# Patient Record
Sex: FEMALE | Race: WHITE | Employment: UNEMPLOYED | URBAN - NONMETROPOLITAN AREA
[De-identification: names, ages, dates, MRNs, and addresses within clinical notes are randomized per-mention and may not be internally consistent; named-entity substitution may affect disease eponyms.]

---

## 2022-02-13 ENCOUNTER — HOSPITAL ENCOUNTER (INPATIENT)
Age: 57
LOS: 2 days | Discharge: HOME | DRG: 439 | End: 2022-02-15
Attending: EMERGENCY MEDICINE
Payer: COMMERCIAL

## 2022-02-13 ENCOUNTER — APPOINTMENT (OUTPATIENT)
Dept: CT IMAGING | Age: 57
DRG: 439 | End: 2022-02-13
Payer: COMMERCIAL

## 2022-02-13 DIAGNOSIS — K85.02 IDIOPATHIC ACUTE PANCREATITIS WITH INFECTED NECROSIS: Primary | ICD-10-CM

## 2022-02-13 PROBLEM — K85.90 PANCREATITIS, UNSPECIFIED PANCREATITIS TYPE: Status: ACTIVE | Noted: 2022-02-13

## 2022-02-13 LAB
ALBUMIN SERPL-MCNC: 4 GM/DL (ref 3.4–5)
ALP BLD-CCNC: 81 U/L (ref 46–116)
ALT SERPL-CCNC: 24 U/L (ref 14–63)
AMORPHOUS: ABNORMAL
ANION GAP: 9 MEQ/L (ref 8–16)
AST SERPL-CCNC: 15 U/L (ref 15–37)
BACTERIA: ABNORMAL
BASOPHILS # BLD: 0.8 % (ref 0–3)
BILIRUB SERPL-MCNC: 0.6 MG/DL (ref 0.2–1)
BILIRUBIN URINE: NEGATIVE
BLOOD, URINE: NEGATIVE
BUN BLDV-MCNC: 15 MG/DL (ref 7–18)
CASTS UA: ABNORMAL /LPF
CHARACTER, URINE: CLEAR
CHLORIDE BLD-SCNC: 104 MEQ/L (ref 98–107)
CO2: 28 MEQ/L (ref 21–32)
COLOR: ABNORMAL
CREAT SERPL-MCNC: 0.9 MG/DL (ref 0.6–1.3)
CRYSTALS, UA: ABNORMAL
EOSINOPHILS RELATIVE PERCENT: 1 % (ref 0–4)
EPITHELIAL CELLS, UA: ABNORMAL /HPF
GFR, ESTIMATED: 69 ML/MIN/1.73M2
GLUCOSE BLD-MCNC: 123 MG/DL (ref 74–106)
GLUCOSE, URINE: NEGATIVE MG/DL
HCT VFR BLD CALC: 44.8 % (ref 37–47)
HEMOGLOBIN: 14.6 GM/DL (ref 12–16)
KETONES, URINE: NEGATIVE
LACTATE: 2 MMOL/L (ref 0.9–1.7)
LEUKOCYTE ESTERASE, URINE: NEGATIVE
LIPASE: > 1500 U/L (ref 73–393)
LIPASE: > 3000 U/L (ref 5.6–51.3)
LYMPHOCYTES # BLD: 16.3 % (ref 15–47)
MCH RBC QN AUTO: 31.5 PG (ref 27–31)
MCHC RBC AUTO-ENTMCNC: 32.7 GM/DL (ref 33–37)
MCV RBC AUTO: 96.5 FL (ref 81–99)
MONOCYTES: 7.2 % (ref 0–12)
MUCUS: ABNORMAL
NITRITE, URINE: NEGATIVE
PDW BLD-RTO: 12.1 % (ref 11.5–14.5)
PH UA: 7 (ref 5–9)
PLATELET # BLD: 344 THOU/MM3 (ref 130–400)
PLATELET ESTIMATE: ADEQUATE
PMV BLD AUTO: 7.7 FL (ref 7.4–10.4)
POC CALCIUM: 8.8 MG/DL (ref 8.5–10.1)
POTASSIUM SERPL-SCNC: 3.6 MEQ/L (ref 3.5–5.1)
PROTEIN UA: 100 MG/DL
RBC # BLD: 4.64 MILL/MM3 (ref 4.2–5.4)
RBC UA: ABNORMAL /HPF
REFLEX TO URINE C & S: ABNORMAL
SCAN OF BLOOD SMEAR: NORMAL
SEGS: 74.7 % (ref 43–75)
SODIUM BLD-SCNC: 141 MEQ/L (ref 136–145)
SPECIFIC GRAVITY UA: 1.01 (ref 1–1.03)
TOTAL PROTEIN: 7.8 GM/DL (ref 6.4–8.2)
UROBILINOGEN, URINE: 0.2 EU/DL (ref 0–1)
WBC # BLD: 14.9 THOU/MM3 (ref 4.8–10.8)
WBC UA: ABNORMAL /HPF

## 2022-02-13 PROCEDURE — 2580000003 HC RX 258: Performed by: EMERGENCY MEDICINE

## 2022-02-13 PROCEDURE — 87040 BLOOD CULTURE FOR BACTERIA: CPT

## 2022-02-13 PROCEDURE — 96375 TX/PRO/DX INJ NEW DRUG ADDON: CPT

## 2022-02-13 PROCEDURE — 81001 URINALYSIS AUTO W/SCOPE: CPT

## 2022-02-13 PROCEDURE — 83690 ASSAY OF LIPASE: CPT

## 2022-02-13 PROCEDURE — 74177 CT ABD & PELVIS W/CONTRAST: CPT

## 2022-02-13 PROCEDURE — 6360000002 HC RX W HCPCS: Performed by: PHYSICIAN ASSISTANT

## 2022-02-13 PROCEDURE — 2060000000 HC ICU INTERMEDIATE R&B

## 2022-02-13 PROCEDURE — 96374 THER/PROPH/DIAG INJ IV PUSH: CPT

## 2022-02-13 PROCEDURE — 99223 1ST HOSP IP/OBS HIGH 75: CPT | Performed by: HOSPITALIST

## 2022-02-13 PROCEDURE — 85025 COMPLETE CBC W/AUTO DIFF WBC: CPT

## 2022-02-13 PROCEDURE — 80053 COMPREHEN METABOLIC PANEL: CPT

## 2022-02-13 PROCEDURE — 6370000000 HC RX 637 (ALT 250 FOR IP): Performed by: PHYSICIAN ASSISTANT

## 2022-02-13 PROCEDURE — 99222 1ST HOSP IP/OBS MODERATE 55: CPT | Performed by: PHYSICIAN ASSISTANT

## 2022-02-13 PROCEDURE — 83605 ASSAY OF LACTIC ACID: CPT

## 2022-02-13 PROCEDURE — 6360000002 HC RX W HCPCS: Performed by: EMERGENCY MEDICINE

## 2022-02-13 PROCEDURE — 99282 EMERGENCY DEPT VISIT SF MDM: CPT

## 2022-02-13 PROCEDURE — 2580000003 HC RX 258: Performed by: PHYSICIAN ASSISTANT

## 2022-02-13 PROCEDURE — 6360000004 HC RX CONTRAST MEDICATION: Performed by: EMERGENCY MEDICINE

## 2022-02-13 RX ORDER — SODIUM CHLORIDE 9 MG/ML
INJECTION, SOLUTION INTRAVENOUS CONTINUOUS
Status: DISCONTINUED | OUTPATIENT
Start: 2022-02-13 | End: 2022-02-15 | Stop reason: HOSPADM

## 2022-02-13 RX ORDER — ONDANSETRON 2 MG/ML
4 INJECTION INTRAMUSCULAR; INTRAVENOUS EVERY 6 HOURS PRN
Status: DISCONTINUED | OUTPATIENT
Start: 2022-02-13 | End: 2022-02-15 | Stop reason: HOSPADM

## 2022-02-13 RX ORDER — ACETAMINOPHEN 650 MG/1
650 SUPPOSITORY RECTAL EVERY 6 HOURS PRN
Status: DISCONTINUED | OUTPATIENT
Start: 2022-02-13 | End: 2022-02-15 | Stop reason: HOSPADM

## 2022-02-13 RX ORDER — POLYETHYLENE GLYCOL 3350 17 G/17G
17 POWDER, FOR SOLUTION ORAL DAILY PRN
Status: DISCONTINUED | OUTPATIENT
Start: 2022-02-13 | End: 2022-02-15 | Stop reason: HOSPADM

## 2022-02-13 RX ORDER — SODIUM CHLORIDE 9 MG/ML
25 INJECTION, SOLUTION INTRAVENOUS PRN
Status: DISCONTINUED | OUTPATIENT
Start: 2022-02-13 | End: 2022-02-15 | Stop reason: HOSPADM

## 2022-02-13 RX ORDER — SODIUM CHLORIDE 0.9 % (FLUSH) 0.9 %
5-40 SYRINGE (ML) INJECTION EVERY 12 HOURS SCHEDULED
Status: DISCONTINUED | OUTPATIENT
Start: 2022-02-13 | End: 2022-02-15 | Stop reason: HOSPADM

## 2022-02-13 RX ORDER — ONDANSETRON 4 MG/1
4 TABLET, ORALLY DISINTEGRATING ORAL EVERY 8 HOURS PRN
Status: DISCONTINUED | OUTPATIENT
Start: 2022-02-13 | End: 2022-02-15 | Stop reason: HOSPADM

## 2022-02-13 RX ORDER — HYDROCHLOROTHIAZIDE 25 MG/1
25 TABLET ORAL DAILY
Status: ON HOLD | COMMUNITY
Start: 2021-12-11 | End: 2022-02-15 | Stop reason: HOSPADM

## 2022-02-13 RX ORDER — M-VIT,TX,IRON,MINS/CALC/FOLIC 27MG-0.4MG
1 TABLET ORAL DAILY
COMMUNITY

## 2022-02-13 RX ORDER — POTASSIUM CHLORIDE 7.45 MG/ML
10 INJECTION INTRAVENOUS PRN
Status: DISCONTINUED | OUTPATIENT
Start: 2022-02-13 | End: 2022-02-15 | Stop reason: HOSPADM

## 2022-02-13 RX ORDER — HYDROCHLOROTHIAZIDE 25 MG/1
25 TABLET ORAL DAILY
Status: DISCONTINUED | OUTPATIENT
Start: 2022-02-13 | End: 2022-02-15 | Stop reason: HOSPADM

## 2022-02-13 RX ORDER — SODIUM CHLORIDE 0.9 % (FLUSH) 0.9 %
5-40 SYRINGE (ML) INJECTION PRN
Status: DISCONTINUED | OUTPATIENT
Start: 2022-02-13 | End: 2022-02-15 | Stop reason: HOSPADM

## 2022-02-13 RX ORDER — POTASSIUM CHLORIDE 20 MEQ/1
40 TABLET, EXTENDED RELEASE ORAL PRN
Status: DISCONTINUED | OUTPATIENT
Start: 2022-02-13 | End: 2022-02-15 | Stop reason: HOSPADM

## 2022-02-13 RX ORDER — 0.9 % SODIUM CHLORIDE 0.9 %
1000 INTRAVENOUS SOLUTION INTRAVENOUS ONCE
Status: COMPLETED | OUTPATIENT
Start: 2022-02-13 | End: 2022-02-13

## 2022-02-13 RX ORDER — MAGNESIUM SULFATE IN WATER 40 MG/ML
2000 INJECTION, SOLUTION INTRAVENOUS PRN
Status: DISCONTINUED | OUTPATIENT
Start: 2022-02-13 | End: 2022-02-15 | Stop reason: HOSPADM

## 2022-02-13 RX ORDER — ACETAMINOPHEN 325 MG/1
650 TABLET ORAL EVERY 6 HOURS PRN
Status: DISCONTINUED | OUTPATIENT
Start: 2022-02-13 | End: 2022-02-15 | Stop reason: HOSPADM

## 2022-02-13 RX ORDER — ONDANSETRON 2 MG/ML
4 INJECTION INTRAMUSCULAR; INTRAVENOUS ONCE
Status: COMPLETED | OUTPATIENT
Start: 2022-02-13 | End: 2022-02-13

## 2022-02-13 RX ADMIN — SODIUM CHLORIDE: 9 INJECTION, SOLUTION INTRAVENOUS at 21:02

## 2022-02-13 RX ADMIN — HYDROMORPHONE HYDROCHLORIDE 1 MG: 1 INJECTION, SOLUTION INTRAMUSCULAR; INTRAVENOUS; SUBCUTANEOUS at 13:52

## 2022-02-13 RX ADMIN — HYDROCHLOROTHIAZIDE 25 MG: 25 TABLET ORAL at 23:45

## 2022-02-13 RX ADMIN — IOPAMIDOL 100 ML: 755 INJECTION, SOLUTION INTRAVENOUS at 14:22

## 2022-02-13 RX ADMIN — PIPERACILLIN AND TAZOBACTAM 4500 MG: 4; .5 INJECTION, POWDER, FOR SOLUTION INTRAVENOUS at 16:20

## 2022-02-13 RX ADMIN — ONDANSETRON 4 MG: 2 INJECTION INTRAMUSCULAR; INTRAVENOUS at 13:52

## 2022-02-13 RX ADMIN — SODIUM CHLORIDE 1000 ML: 9 INJECTION, SOLUTION INTRAVENOUS at 13:57

## 2022-02-13 RX ADMIN — HYDROMORPHONE HYDROCHLORIDE 1 MG: 1 INJECTION, SOLUTION INTRAMUSCULAR; INTRAVENOUS; SUBCUTANEOUS at 23:45

## 2022-02-13 RX ADMIN — SODIUM CHLORIDE, PRESERVATIVE FREE 10 ML: 5 INJECTION INTRAVENOUS at 23:31

## 2022-02-13 RX ADMIN — ONDANSETRON 4 MG: 2 INJECTION INTRAMUSCULAR; INTRAVENOUS at 19:03

## 2022-02-13 RX ADMIN — HYDROMORPHONE HYDROCHLORIDE 1 MG: 1 INJECTION, SOLUTION INTRAMUSCULAR; INTRAVENOUS; SUBCUTANEOUS at 18:58

## 2022-02-13 ASSESSMENT — ENCOUNTER SYMPTOMS
NAUSEA: 1
ABDOMINAL PAIN: 1
DIARRHEA: 1
BACK PAIN: 1
SORE THROAT: 0
VOMITING: 1
COUGH: 0
WHEEZING: 0
SHORTNESS OF BREATH: 0

## 2022-02-13 ASSESSMENT — PAIN DESCRIPTION - DESCRIPTORS: DESCRIPTORS: ACHING

## 2022-02-13 ASSESSMENT — PAIN DESCRIPTION - ONSET: ONSET: ON-GOING

## 2022-02-13 ASSESSMENT — PAIN SCALES - GENERAL
PAINLEVEL_OUTOF10: 7
PAINLEVEL_OUTOF10: 10
PAINLEVEL_OUTOF10: 7
PAINLEVEL_OUTOF10: 10
PAINLEVEL_OUTOF10: 10

## 2022-02-13 ASSESSMENT — PAIN DESCRIPTION - LOCATION
LOCATION: ABDOMEN

## 2022-02-13 ASSESSMENT — PAIN DESCRIPTION - PAIN TYPE
TYPE: ACUTE PAIN

## 2022-02-13 ASSESSMENT — PAIN DESCRIPTION - ORIENTATION: ORIENTATION: MID

## 2022-02-13 ASSESSMENT — PAIN DESCRIPTION - FREQUENCY: FREQUENCY: CONTINUOUS

## 2022-02-13 NOTE — ED PROVIDER NOTES
[facility]  eMERGENCY dEPARTMENT eNCOUnter             Dorothy Reese 19 COMPLAINT    Chief Complaint   Patient presents with    Abdominal Pain     started 22 about 2 am       Nurses Notes reviewed and I agree except as noted in the HPI. HPI    Velva Scheuermann is a 64 y.o. female who presents appreciating epigastric pain radiating all over her abdomen and into her back, onset at 2:00 this morning, associated nausea and vomiting several times. She is tearful. She denies any previous similar episodes. She did drink \"2 alcoholic beverages \"yesterday afternoon. No previous history of pancreatitis. She has had her gallbladder removed in the past.  Cholecystectomy was complicated by injury to the common bile duct. That was in 2016, with no subsequent problems noted. Current pain level is 10/10, aching, cramping, constant. REVIEW OF SYSTEMS      Review of Systems   Constitutional: Positive for chills and malaise/fatigue. Negative for fever. HENT: Negative for congestion and sore throat. Respiratory: Negative for cough, shortness of breath and wheezing. Cardiovascular: Negative for chest pain and palpitations. Gastrointestinal: Positive for abdominal pain, diarrhea, nausea and vomiting. Genitourinary: Negative for dysuria. Musculoskeletal: Positive for back pain. Neurological: Negative for dizziness and headaches. All other systems reviewed and are negative. PAST MEDICAL HISTORY     has a past medical history of GERD (gastroesophageal reflux disease), Hyperlipidemia, and Hypertension. SURGICAL HISTORY     has a past surgical history that includes  section (8876,8672); Tubal ligation (); Cholecystectomy (2016); ERCP (16); Colonoscopy (2016); and Foot surgery (Left, 2021).     CURRENT MEDICATIONS    Previous Medications    HYDROCHLOROTHIAZIDE (HYDRODIURIL) 25 MG TABLET    Take 25 mg by mouth daily MULTIPLE VITAMINS-MINERALS (THERAPEUTIC MULTIVITAMIN-MINERALS) TABLET    Take 1 tablet by mouth daily       ALLERGIES    has No Known Allergies. FAMILY HISTORY    She indicated that her mother is . She indicated that her father is alive. She indicated that the status of her brother is unknown. She indicated that the status of her neg hx is unknown.   family history includes Cancer in her mother; Heart Disease in her father; Heart Failure in her father; Heart Surgery in her father; High Blood Pressure in her father; High Cholesterol in her brother and father; Hypertension in her brother. SOCIAL HISTORY     reports that she quit smoking about 28 years ago. She has never used smokeless tobacco. She reports current alcohol use of about 1.0 standard drink of alcohol per week. She reports that she does not use drugs. PHYSICAL EXAM       INITIAL VITALS: BP (!) 153/65   Pulse 91   Temp 96.9 °F (36.1 °C) (Temporal)   Resp 20   Ht 5' 7\" (1.702 m)   Wt 180 lb (81.6 kg)   LMP 2016 (Approximate)   SpO2 94%   BMI 28.19 kg/m²      Physical Exam  Vitals and nursing note reviewed. Exam conducted with a chaperone present. Constitutional:       General: She is in acute distress. Appearance: She is ill-appearing. HENT:      Mouth/Throat:      Mouth: Mucous membranes are moist.   Eyes:      Pupils: Pupils are equal, round, and reactive to light. Cardiovascular:      Rate and Rhythm: Regular rhythm. Heart sounds: No murmur heard. Pulmonary:      Effort: Pulmonary effort is normal. No respiratory distress. Breath sounds: Normal breath sounds. No wheezing. Abdominal:      General: Bowel sounds are decreased. Palpations: Abdomen is soft. There is no hepatomegaly, splenomegaly or mass. Tenderness: There is abdominal tenderness in the epigastric area and periumbilical area. There is guarding. There is no right CVA tenderness, left CVA tenderness or rebound.    Skin: General: Skin is dry. Findings: No erythema. Comments: Cool   Neurological:      General: No focal deficit present. Mental Status: She is alert and oriented to person, place, and time. Psychiatric:      Comments: Severe pain behavior         RADIOLOGY:    CT ABDOMEN PELVIS W IV CONTRAST Additional Contrast? None   Final Result   1. Diffuse peripancreatic inflammation relating to acute pancreatitis. 2. Anne Hilts- enhancement of the body of the pancreas suggest necrosis. 3. The postcholecystectomy common bile duct is dilated at 9 mm            **This report has been created using voice recognition software. It may contain minor errors which are inherent in voice recognition technology. **      Final report electronically signed by Dr Phoebe Higuera on 2/13/2022 2:35 PM             LABS:     Labs Reviewed   CBC WITH AUTO DIFFERENTIAL - Abnormal; Notable for the following components:       Result Value    WBC 14.9 (*)     MCH 31.5 (*)     MCHC 32.7 (*)     All other components within normal limits   COMPREHENSIVE METABOLIC PANEL - Abnormal; Notable for the following components:    Glucose 123 (*)     All other components within normal limits   LIPASE - Abnormal; Notable for the following components:    Lipase >1,500.0 (*)     All other components within normal limits   URINE RT REFLEX TO CULTURE - Abnormal; Notable for the following components:    Protein,  (*)     Color, UA DARK YELLOW (*)     All other components within normal limits   GLOMERULAR FILTRATION RATE, ESTIMATED - Abnormal; Notable for the following components:    GFR, Estimated 69 (*)     All other components within normal limits   LACTIC ACID - Abnormal; Notable for the following components:    Lactate 2.00 (*)     All other components within normal limits   CULTURE, BLOOD 1   CULTURE, BLOOD 2   SCAN OF BLOOD SMEAR   ANION GAP   LIPASE       Vitals:    Vitals:    02/13/22 1330   BP: (!) 153/65   Pulse: 91   Resp: 20   Temp: 96.9 °F (36.1 °C)   TempSrc: Temporal   SpO2: 94%   Weight: 180 lb (81.6 kg)   Height: 5' 7\" (1.702 m)       EMERGENCY DEPARTMENT COURSE:    Feeling better with IV hydration, pain, nausea medication. After blood cultures, IV Zosyn given. Arrangements are made for admission for further care for pancreatitis with necrosis. FINAL IMPRESSION      1. Idiopathic acute pancreatitis with infected necrosis        DISPOSITION/PLAN    DISPOSITION Decision To Admit 02/13/2022 02:46:50 PM   Ambulance transport to [facility] to the care of [facility].        (Please note that portions of this note were completed with a voice recognition program.  Efforts were made to edit the dictations but occasionally words are mis-transcribed.)      John Reyes MD  02/13/22 3924

## 2022-02-13 NOTE — H&P
Hospitalist - History & Physical      Patient: Jessica Cortes    Unit/Bed:4K-19/019-A  YOB: 1965  MRN: [id number]   Acct: [de-identified]   PCP: KATIE Esteves CNP      Assessment and Plan:        1. Acute pancreatitis:   a. NPO  b. IV fluids  c. Pain control  d. GI consult      CC:  Abdominal pain    HPI: Patient transferred from [facility] for further evaluation of pancreatitis. The patient states she woke up at about 2am on the day of admission with excruciating abdominal pain and nausea. She has not been able to keep anything down since. The patient was found to have very elevated lipase. She had her gallbladder removed in 2016. There are no fevers, no shortness of breath, no chest pain. Patient is admitted for further evaluation of pancreatitis. ROS: Review of Systems   Constitutional: Negative. HENT: Negative. Eyes: Negative. Respiratory: Negative. Cardiovascular: Negative. Gastrointestinal: Positive for abdominal pain, nausea and vomiting. Endocrine: Negative. Genitourinary: Negative. Musculoskeletal: Negative. Skin: Negative. Allergic/Immunologic: Negative. Neurological: Negative. Hematological: Negative. Psychiatric/Behavioral: Negative. PMH:    Past Medical History:   Diagnosis Date    GERD (gastroesophageal reflux disease)     Hyperlipidemia     Hypertension      SHX:    Social History     Socioeconomic History    Marital status:      Spouse name: Not on file    Number of children: Not on file    Years of education: Not on file    Highest education level: Not on file   Occupational History    Not on file   Tobacco Use    Smoking status: Former Smoker     Quit date: 1993     Years since quittin.5    Smokeless tobacco: Never Used   Substance and Sexual Activity    Alcohol use:  Yes     Alcohol/week: 1.0 standard drink     Types: 1 Glasses of wine per week Comment: ocassional wine    Drug use: No    Sexual activity: Yes     Partners: Male   Other Topics Concern    Not on file   Social History Narrative    Not on file     Social Determinants of Health     Financial Resource Strain:     Difficulty of Paying Living Expenses: Not on file   Food Insecurity:     Worried About Running Out of Food in the Last Year: Not on file    Clarissa of Food in the Last Year: Not on file   Transportation Needs:     Lack of Transportation (Medical): Not on file    Lack of Transportation (Non-Medical):  Not on file   Physical Activity:     Days of Exercise per Week: Not on file    Minutes of Exercise per Session: Not on file   Stress:     Feeling of Stress : Not on file   Social Connections:     Frequency of Communication with Friends and Family: Not on file    Frequency of Social Gatherings with Friends and Family: Not on file    Attends Scientologist Services: Not on file    Active Member of [address] [place] [address] or Organizations: Not on file    Attends Club or Organization Meetings: Not on file    Marital Status: Not on file   Intimate Partner Violence:     Fear of Current or Ex-Partner: Not on file    Emotionally Abused: Not on file    Physically Abused: Not on file    Sexually Abused: Not on file   Housing Stability:     Unable to Pay for Housing in the Last Year: Not on file    Number of [place] in the Last Year: Not on file    Unstable Housing in the Last Year: Not on file     FHX:   Family History   Problem Relation Age of Onset    Cancer Mother     High Blood Pressure Father     High Cholesterol Father     Heart Disease Father     Heart Failure Father     Heart Surgery Father     High Cholesterol Brother     Hypertension Brother     Learning Disabilities Neg Hx      Allergies: No Known Allergies  Medications:     sodium chloride      sodium chloride        hydroCHLOROthiazide  25 mg Oral Daily    sodium chloride flush  5-40 mL IntraVENous 2 times per day    enoxaparin  40 mg SubCUTAneous Daily     sodium chloride flush, 5-40 mL, PRN  sodium chloride, 25 mL, PRN  ondansetron, 4 mg, Q8H PRN   Or  ondansetron, 4 mg, Q6H PRN  polyethylene glycol, 17 g, Daily PRN  acetaminophen, 650 mg, Q6H PRN   Or  acetaminophen, 650 mg, Q6H PRN  potassium chloride, 40 mEq, PRN   Or  potassium alternative oral replacement, 40 mEq, PRN   Or  potassium chloride, 10 mEq, PRN  magnesium sulfate, 2,000 mg, PRN  HYDROmorphone, 0.5 mg, Q4H PRN   Or  HYDROmorphone, 1 mg, Q4H PRN        Labs:   Recent Results (from the past 24 hour(s))   CBC auto differential    Collection Time: 02/13/22  1:50 PM   Result Value Ref Range    WBC 14.9 (H) 4.8 - 10.8 thou/mm3    RBC 4.64 4.20 - 5.40 mill/mm3    Hemoglobin 14.6 12.0 - 16.0 gm/dl    Hematocrit 44.8 37.0 - 47.0 %    MCV 96.5 81.0 - 99.0 fL    MCH 31.5 (H) 27.0 - 31.0 pg    MCHC 32.7 (L) 33.0 - 37.0 gm/dl    RDW 12.1 11.5 - 14.5 %    Platelets 353 128 - 573 thou/mm3    MPV 7.7 7.4 - 10.4 fL    SEGS 74.7 43.0 - 75.0 %    Lymphocytes 16.3 15.0 - 47.0 %    Monocytes 7.2 0.0 - 12.0 %    Eosinophils % 1.0 0.0 - 4.0 %    Basophils 0.8 0.0 - 3.0 %    Platelet Estimate ADEQUATE Adequate   Comprehensive metabolic panel    Collection Time: 02/13/22  1:50 PM   Result Value Ref Range    Glucose 123 (H) 74 - 106 mg/dl    CREATININE 0.9 0.6 - 1.3 mg/dl    BUN 15 7 - 18 mg/dl    Sodium 141 136 - 145 meq/l    Potassium 3.6 3.5 - 5.1 meq/l    Chloride 104 98 - 107 meq/l    CO2 28 21 - 32 meq/l    POC CALCIUM 8.8 8.5 - 10.1 mg/dl    AST 15 15 - 37 U/L    Alkaline Phosphatase 81 46 - 116 U/L    Total Protein 7.8 6.4 - 8.2 gm/dl    Albumin 4.0 3.4 - 5.0 gm/dl    Total Bilirubin 0.6 0.2 - 1.0 mg/dl    ALT 24 14 - 63 U/L   Lipase    Collection Time: 02/13/22  1:50 PM   Result Value Ref Range    Lipase >1,500.0 (H) 73.0 - 393.0 U/L   Scan of Blood Smear    Collection Time: 02/13/22  1:50 PM   Result Value Ref Range    SCAN OF BLOOD SMEAR see below    Glomerular Filtration Rate, Estimated    Collection Time: 02/13/22  1:50 PM   Result Value Ref Range    GFR, Estimated 69 (A) ml/min/1.73m2   ANION GAP    Collection Time: 02/13/22  1:50 PM   Result Value Ref Range    Anion Gap 9.0 8.0 - 16.0 meq/l   Urinalysis Reflex to Culture    Collection Time: 02/13/22  2:39 PM    Specimen: Urine, clean catch   Result Value Ref Range    Glucose, Urine NEGATIVE NEGATIVE mg/dl    Bilirubin Urine NEGATIVE     Ketones, Urine NEGATIVE NEGATIVE    Specific Gravity, UA 1.015 1.002 - 1.030    Blood, Urine NEGATIVE NEGATIVE    pH, UA 7.0 5.0 - 9.0    Protein,  (A) NEGATIVE mg/dl    Urobilinogen, Urine 0.2 0.0 - 1.0 eu/dl    Nitrite, Urine NEGATIVE NEGATIVE    Leukocyte Esterase, Urine NEGATIVE NEGATIVE    Color, UA DARK YELLOW (A) STRAW-YELLOW    Character, Urine CLEAR CLEAR-SL CLOUD    RBC, UA NONE 0-2/hpf /hpf    WBC, UA NONE 0-4/hpf /hpf    Epithelial Cells, UA 5-10 3-5/hpf /hpf    Amorphous, UA NONE SEEN none seen    Mucus, UA THREADS none seen    Bacteria, UA FEW few/none seen    Casts UA NONE SEEN none seen /lpf    Crystals, UA NONE SEEN none seen    REFLEX TO URINE C & S NOT INDICATED    Lactic Acid [facility] Only)    Collection Time: 02/13/22  2:55 PM   Result Value Ref Range    Lactate 2.00 (H) 0.90 - 1.70 mmol/L         Vital Signs: T: 97.9F P: 79 RR: 18 B/P: 135/75: FiO2: RA: O2 Sat:97%: I/O: No intake or output data in the 24 hours ending 02/13/22 1850      General:   No acute distress  HEENT:  normocephalic and atraumatic. No scleral icterus. PEARLA, mucous membranes moist  Neck: supple. Trachea midline. No JVD. Full ROM, no meningismus. Lungs: clear to auscultation. No retractions, no accessory muscle use. Cardiac: RRR, no murmur, 2+ pulses  Abdomen: soft. Epigastric tenderness. Bowel sounds active  Extremities:  No clubbing, cyanosis x 4, no edema    Vasculature: capillary refill < 3 seconds. Skin:  warm and dry. no visible rashes  Psych:  Alert and oriented x3.   Affect appropriate  Lymph:  No supraclavicular adenopathy. Neurologic:  CN II-XII grossly intact. No focal deficit. Data: (All radiographs, tracings, PFTs, and imaging are personally viewed and interpreted unless otherwise noted).     Outside data reviewed   EKG: none this encounter        Electronically signed by  Annie Wood PA-C

## 2022-02-13 NOTE — ED NOTES
Pt stable and released from [facility] as a direct admit to [facility] 4K-19 via private car. Pt is in stable condition. Both IV lines were flushed and capped.       Terence Saleem RN  02/13/22 Víctor Saucedo RN  02/13/22 0783

## 2022-02-14 ENCOUNTER — APPOINTMENT (OUTPATIENT)
Dept: GENERAL RADIOLOGY | Age: 57
DRG: 439 | End: 2022-02-14
Payer: COMMERCIAL

## 2022-02-14 LAB
ANION GAP SERPL CALCULATED.3IONS-SCNC: 12 MEQ/L (ref 8–16)
BASOPHILS # BLD: 0.1 %
BASOPHILS ABSOLUTE: 0 THOU/MM3 (ref 0–0.1)
BUN BLDV-MCNC: 10 MG/DL (ref 7–22)
CALCIUM SERPL-MCNC: 7.8 MG/DL (ref 8.5–10.5)
CHLORIDE BLD-SCNC: 106 MEQ/L (ref 98–111)
CO2: 21 MEQ/L (ref 23–33)
CREAT SERPL-MCNC: 0.6 MG/DL (ref 0.4–1.2)
EOSINOPHIL # BLD: 0.3 %
EOSINOPHILS ABSOLUTE: 0 THOU/MM3 (ref 0–0.4)
ERYTHROCYTE [DISTWIDTH] IN BLOOD BY AUTOMATED COUNT: 12.5 % (ref 11.5–14.5)
ERYTHROCYTE [DISTWIDTH] IN BLOOD BY AUTOMATED COUNT: 44.6 FL (ref 35–45)
GFR SERPL CREATININE-BSD FRML MDRD: > 90 ML/MIN/1.73M2
GLUCOSE BLD-MCNC: 95 MG/DL (ref 70–108)
HCT VFR BLD CALC: 37.9 % (ref 37–47)
HEMOGLOBIN: 12.4 GM/DL (ref 12–16)
IMMATURE GRANS (ABS): 0.03 THOU/MM3 (ref 0–0.07)
IMMATURE GRANULOCYTES: 0.3 %
LYMPHOCYTES # BLD: 14.9 %
LYMPHOCYTES ABSOLUTE: 1.3 THOU/MM3 (ref 1–4.8)
MCH RBC QN AUTO: 31.6 PG (ref 26–33)
MCHC RBC AUTO-ENTMCNC: 32.7 GM/DL (ref 32.2–35.5)
MCV RBC AUTO: 96.4 FL (ref 81–99)
MONOCYTES # BLD: 4.2 %
MONOCYTES ABSOLUTE: 0.4 THOU/MM3 (ref 0.4–1.3)
NUCLEATED RED BLOOD CELLS: 0 /100 WBC
PLATELET # BLD: 254 THOU/MM3 (ref 130–400)
PMV BLD AUTO: 9.7 FL (ref 9.4–12.4)
POTASSIUM REFLEX MAGNESIUM: 3.6 MEQ/L (ref 3.5–5.2)
RBC # BLD: 3.93 MILL/MM3 (ref 4.2–5.4)
SEG NEUTROPHILS: 80.2 %
SEGMENTED NEUTROPHILS ABSOLUTE COUNT: 7 THOU/MM3 (ref 1.8–7.7)
SODIUM BLD-SCNC: 139 MEQ/L (ref 135–145)
TRIGL SERPL-MCNC: 97 MG/DL (ref 0–199)
WBC # BLD: 8.7 THOU/MM3 (ref 4.8–10.8)

## 2022-02-14 PROCEDURE — 36415 COLL VENOUS BLD VENIPUNCTURE: CPT

## 2022-02-14 PROCEDURE — 85025 COMPLETE CBC W/AUTO DIFF WBC: CPT

## 2022-02-14 PROCEDURE — 2580000003 HC RX 258: Performed by: STUDENT IN AN ORGANIZED HEALTH CARE EDUCATION/TRAINING PROGRAM

## 2022-02-14 PROCEDURE — 84478 ASSAY OF TRIGLYCERIDES: CPT

## 2022-02-14 PROCEDURE — 6360000002 HC RX W HCPCS: Performed by: STUDENT IN AN ORGANIZED HEALTH CARE EDUCATION/TRAINING PROGRAM

## 2022-02-14 PROCEDURE — 2060000000 HC ICU INTERMEDIATE R&B

## 2022-02-14 PROCEDURE — 71045 X-RAY EXAM CHEST 1 VIEW: CPT

## 2022-02-14 PROCEDURE — 2580000003 HC RX 258: Performed by: PHYSICIAN ASSISTANT

## 2022-02-14 PROCEDURE — 99233 SBSQ HOSP IP/OBS HIGH 50: CPT | Performed by: HOSPITALIST

## 2022-02-14 PROCEDURE — 80048 BASIC METABOLIC PNL TOTAL CA: CPT

## 2022-02-14 PROCEDURE — C9113 INJ PANTOPRAZOLE SODIUM, VIA: HCPCS | Performed by: STUDENT IN AN ORGANIZED HEALTH CARE EDUCATION/TRAINING PROGRAM

## 2022-02-14 PROCEDURE — 6360000002 HC RX W HCPCS: Performed by: PHYSICIAN ASSISTANT

## 2022-02-14 RX ORDER — ZINC GLUCONATE 50 MG
50 TABLET ORAL DAILY
COMMUNITY

## 2022-02-14 RX ORDER — PANTOPRAZOLE SODIUM 40 MG/10ML
40 INJECTION, POWDER, LYOPHILIZED, FOR SOLUTION INTRAVENOUS DAILY
Status: DISCONTINUED | OUTPATIENT
Start: 2022-02-14 | End: 2022-02-15 | Stop reason: HOSPADM

## 2022-02-14 RX ADMIN — SODIUM CHLORIDE, PRESERVATIVE FREE 10 ML: 5 INJECTION INTRAVENOUS at 20:02

## 2022-02-14 RX ADMIN — SODIUM CHLORIDE: 9 INJECTION, SOLUTION INTRAVENOUS at 04:17

## 2022-02-14 RX ADMIN — SODIUM CHLORIDE: 9 INJECTION, SOLUTION INTRAVENOUS at 20:02

## 2022-02-14 RX ADMIN — HYDROMORPHONE HYDROCHLORIDE 1 MG: 1 INJECTION, SOLUTION INTRAMUSCULAR; INTRAVENOUS; SUBCUTANEOUS at 04:16

## 2022-02-14 RX ADMIN — HYDROMORPHONE HYDROCHLORIDE 1 MG: 1 INJECTION, SOLUTION INTRAMUSCULAR; INTRAVENOUS; SUBCUTANEOUS at 08:09

## 2022-02-14 RX ADMIN — HYDROMORPHONE HYDROCHLORIDE 0.5 MG: 1 INJECTION, SOLUTION INTRAMUSCULAR; INTRAVENOUS; SUBCUTANEOUS at 14:30

## 2022-02-14 RX ADMIN — PANTOPRAZOLE SODIUM 40 MG: 40 INJECTION, POWDER, FOR SOLUTION INTRAVENOUS at 11:26

## 2022-02-14 RX ADMIN — ENOXAPARIN SODIUM 40 MG: 100 INJECTION SUBCUTANEOUS at 08:27

## 2022-02-14 RX ADMIN — SODIUM CHLORIDE, PRESERVATIVE FREE 10 ML: 5 INJECTION INTRAVENOUS at 08:27

## 2022-02-14 RX ADMIN — SODIUM CHLORIDE: 9 INJECTION, SOLUTION INTRAVENOUS at 08:16

## 2022-02-14 RX ADMIN — SODIUM CHLORIDE: 9 INJECTION, SOLUTION INTRAVENOUS at 14:28

## 2022-02-14 ASSESSMENT — PAIN SCALES - GENERAL
PAINLEVEL_OUTOF10: 1
PAINLEVEL_OUTOF10: 0
PAINLEVEL_OUTOF10: 7
PAINLEVEL_OUTOF10: 7
PAINLEVEL_OUTOF10: 2
PAINLEVEL_OUTOF10: 6
PAINLEVEL_OUTOF10: 5
PAINLEVEL_OUTOF10: 2

## 2022-02-14 ASSESSMENT — ENCOUNTER SYMPTOMS
EYES NEGATIVE: 1
ALLERGIC/IMMUNOLOGIC NEGATIVE: 1
ABDOMINAL PAIN: 1
RESPIRATORY NEGATIVE: 1
NAUSEA: 1
VOMITING: 1

## 2022-02-14 ASSESSMENT — PAIN DESCRIPTION - PROGRESSION: CLINICAL_PROGRESSION: GRADUALLY IMPROVING

## 2022-02-14 ASSESSMENT — PAIN DESCRIPTION - ONSET: ONSET: ON-GOING

## 2022-02-14 ASSESSMENT — PAIN DESCRIPTION - DESCRIPTORS: DESCRIPTORS: SHARP;DISCOMFORT

## 2022-02-14 ASSESSMENT — PAIN DESCRIPTION - PAIN TYPE: TYPE: ACUTE PAIN

## 2022-02-14 ASSESSMENT — PAIN DESCRIPTION - FREQUENCY: FREQUENCY: CONTINUOUS

## 2022-02-14 ASSESSMENT — PAIN DESCRIPTION - LOCATION: LOCATION: ABDOMEN

## 2022-02-14 ASSESSMENT — PAIN - FUNCTIONAL ASSESSMENT: PAIN_FUNCTIONAL_ASSESSMENT: PREVENTS OR INTERFERES SOME ACTIVE ACTIVITIES AND ADLS

## 2022-02-14 ASSESSMENT — PAIN DESCRIPTION - ORIENTATION: ORIENTATION: LEFT;UPPER

## 2022-02-14 NOTE — CARE COORDINATION
2/14/22, 8:57 AM EST  DISCHARGE PLANNING EVALUATION:    Brijesh Hendrickson       Admitted: 2/13/2022/ [facility] day: 1   Location: [facility]19/[facility]- Reason for admit: Pancreatitis, unspecified pancreatitis type [K85.90]  Idiopathic acute pancreatitis with infected necrosis [K85.02]   PMH:  has a past medical history of GERD (gastroesophageal reflux disease), Hyperlipidemia, and Hypertension. Barriers to Discharge: From [facility]. Pancreatitis. IVF, IV PPI continued. NPO. PCP: KATIE Arias CNP  Readmission Risk Score: 8.5 ( )%    Patient Goals/Plan/Treatment Preferences: denied needs as plans home w spouse Fredy Saldana independently as PTA  Transportation/Food Security/Housekeeping Addressed:  No issues identified.

## 2022-02-14 NOTE — PROGRESS NOTES
1830- Patient resting in bed. Complaining of 10/10 pain. INT in the R [facility] and in the L forearm. Both flush. No apparent skin issues, two nurse skin assessment by [facility] RN and Tyree Laughlin RN. Physician notified of patients arrival and waiting for orders. VSS. [facility] on way to see patient.

## 2022-02-14 NOTE — PROGRESS NOTES
Pharmacy Medication History Note      List of current medications patient is taking is complete. Source of information: Patient    Changes made to medication list:  Medications removed (include reason, ex. therapy complete or physician discontinued):  None  Medications added/doses adjusted: Added: Zinc tablet 1 tablet daily     Other notes (ex. Recent course of antibiotics, Coumadin dosing):  Denies use of other OTC or herbal medications.     Allergies reviewed    Electronically signed by Lucila Chaney on 2/14/2022 at 2:10 PM

## 2022-02-14 NOTE — PROGRESS NOTES
History & Physical       Patient: Ezequiel Gerber  YOB: 1965    MRN: [id number]     Acct: [de-identified]    PCP: KATIE Navarro CNP    Date of Admission: 2022    Date of Service: Patient seen / examined on 22 and admitted to Inpatient with expected LOS greater than two midnights due to medical therapy. ASSESSMENT / PLAN:     Acute Pancreatitis with Necrosis: No prior history of pancreatitis. Risk factors could include medication induced. Patient on hydrochlorothiazide at home. Denies any alcohol history. History of cholecystectomy. trilycerides normal. [place]'s Criteria with 1 point indicating severe pancreatitis unlikely showing 1% predicted mortality. CT abdomen shows acute pancreatitis with De-enhanced  of body of pancreas suggesting necrosis. CBD dilated at 9 mm. Lipase >3000. Patient meets 3/3 criteria for Acute Pancreatitis. Monitor vitals   -NPO   -Pain control  -Continue Fluids  -Hold HCTZ    Hx of Hypertension  -Controlled   -Hold HCTZ at this time      Chief Complaint:  Epigastric pain    History of Present Illness:  Patient transferred from [facility] for further evaluation of pancreatitis. The patient states she woke up at about 2am on the day of admission with excruciating abdominal pain and nausea. She has not been able to keep anything down since. The patient was found to have very elevated lipase. She had her gallbladder removed in 2016. There are no fevers, no shortness of breath, no chest pain.     Patient is admitted for further evaluation of pancreatiis.       Past Medical History:    Past Medical History:   Diagnosis Date    GERD (gastroesophageal reflux disease)     Hyperlipidemia     Hypertension      Past Surgical History:    Past Surgical History:   Procedure Laterality Date     SECTION  5883,3988    x 2    CHOLECYSTECTOMY  2016    Laparoscopic--Dr. Jacquelin Healy    COLONOSCOPY 2016    Dr. Evans Kenyon ERCP  16    Dr. Yael Chaney Left 2021   [address]      Medications Prior to Admission:   No current facility-administered medications on file prior to encounter. Current Outpatient Medications on File Prior to Encounter   Medication Sig Dispense Refill    zinc gluconate 50 MG tablet Take 50 mg by mouth daily      hydroCHLOROthiazide (HYDRODIURIL) 25 MG tablet Take 25 mg by mouth daily      Multiple Vitamins-Minerals (THERAPEUTIC MULTIVITAMIN-MINERALS) tablet Take 1 tablet by mouth daily       Allergies:   Patient has no known allergies. Social History:   Social History     Socioeconomic History    Marital status:      Spouse name: Not on file    Number of children: Not on file    Years of education: Not on file    Highest education level: Not on file   Occupational History    Not on file   Tobacco Use    Smoking status: Former Smoker     Quit date: 1993     Years since quittin.5    Smokeless tobacco: Never Used   Substance and Sexual Activity    Alcohol use: Yes     Alcohol/week: 1.0 standard drink     Types: 1 Glasses of wine per week     Comment: ocassional wine    Drug use: No    Sexual activity: Yes     Partners: Male   Other Topics Concern    Not on file   Social History Narrative    Not on file     Social Determinants of Health     Financial Resource Strain:     Difficulty of Paying Living Expenses: Not on file   Food Insecurity:     Worried About Running Out of Food in the Last Year: Not on file    Clarissa of Food in the Last Year: Not on file   Transportation Needs:     Lack of Transportation (Medical): Not on file    Lack of Transportation (Non-Medical):  Not on file   Physical Activity:     Days of Exercise per Week: Not on file    Minutes of Exercise per Session: Not on file   Stress:     Feeling of Stress : Not on file   Social Connections:     Frequency of Communication with Friends and Family: Not on file    Frequency of Social Gatherings with Friends and Family: Not on file    Attends Bahai Services: Not on file    Active Member of Clubs or Organizations: Not on file    Attends Club or Organization Meetings: Not on file    Marital Status: Not on file   Intimate Partner Violence:     Fear of Current or Ex-Partner: Not on file    Emotionally Abused: Not on file    Physically Abused: Not on file    Sexually Abused: Not on file   Housing Stability:     Unable to Pay for Housing in the Last Year: Not on file    Number of [place] in the Last Year: Not on file    Unstable Housing in the Last Year: Not on file     Family History:    Family History   Problem Relation Age of Onset    Cancer Mother     High Blood Pressure Father     High Cholesterol Father     Heart Disease Father     Heart Failure Father     Heart Surgery Father     High Cholesterol Brother     Hypertension Brother     Learning Disabilities Neg Hx      REVIEW OF SYSTEMS:  A 14-point ROS was obtained and negative, with the exception of pertinent positives as listed below:    PHYSICAL EXAM:  Vitals:    02/14/22 0408 02/14/22 0809 02/14/22 1116 02/14/22 1602   BP: (!) 109/56 (!) 117/57 119/60 127/62   Pulse: 86 85 78 93   Resp: 16 16 16 16   Temp: 96.1 °F (35.6 °C) 97 °F (36.1 °C) 97 °F (36.1 °C) 97.4 °F (36.3 °C)   TempSrc: Oral Oral Oral Oral   SpO2: 91% 90% 91% 91%   Weight:       Height:         General appearance: Alert / well-appearing. Cooperative. NAD. HEENT:  Normocephalic / atraumatic. PERRL. EOM intact. Conjunctivae appear normal.  Neck: Supple. No JVD. Respiratory: Normal respiratory effort on RA. CTAB. No wheezes / rales / rhonchi. Cardiovascular: RRR. Normal S1/S2. No murmurs / rubs / gallops. Abdomen: Soft  / non-distended. BS present. Abdomen tender to palpation. Musculoskeletal: No cyanosis or edema. Skin: Warm / dry. Normal turgor. Neurologic: A/O x 3.  Speech normal. Answers questions appropriately. CN intact. No obvious focal neurologic deficits. Psychiatric: Thought content / judgment / insight appear appropriate. Capillary refill: Brisk bilaterally. Peripheral pulses: +2 bilaterally.     Labs:   Results for orders placed or performed during the hospital encounter of 02/13/22   Culture, Blood 1    Specimen: Blood   Result Value Ref Range    Blood Culture, Routine No growth-preliminary     CBC auto differential   Result Value Ref Range    WBC 14.9 (H) 4.8 - 10.8 thou/mm3    RBC 4.64 4.20 - 5.40 mill/mm3    Hemoglobin 14.6 12.0 - 16.0 gm/dl    Hematocrit 44.8 37.0 - 47.0 %    MCV 96.5 81.0 - 99.0 fL    MCH 31.5 (H) 27.0 - 31.0 pg    MCHC 32.7 (L) 33.0 - 37.0 gm/dl    RDW 12.1 11.5 - 14.5 %    Platelets 040 657 - 226 thou/mm3    MPV 7.7 7.4 - 10.4 fL    SEGS 74.7 43.0 - 75.0 %    Lymphocytes 16.3 15.0 - 47.0 %    Monocytes 7.2 0.0 - 12.0 %    Eosinophils % 1.0 0.0 - 4.0 %    Basophils 0.8 0.0 - 3.0 %    Platelet Estimate ADEQUATE Adequate   Comprehensive metabolic panel   Result Value Ref Range    Glucose 123 (H) 74 - 106 mg/dl    CREATININE 0.9 0.6 - 1.3 mg/dl    BUN 15 7 - 18 mg/dl    Sodium 141 136 - 145 meq/l    Potassium 3.6 3.5 - 5.1 meq/l    Chloride 104 98 - 107 meq/l    CO2 28 21 - 32 meq/l    POC CALCIUM 8.8 8.5 - 10.1 mg/dl    AST 15 15 - 37 U/L    Alkaline Phosphatase 81 46 - 116 U/L    Total Protein 7.8 6.4 - 8.2 gm/dl    Albumin 4.0 3.4 - 5.0 gm/dl    Total Bilirubin 0.6 0.2 - 1.0 mg/dl    ALT 24 14 - 63 U/L   Lipase   Result Value Ref Range    Lipase >1,500.0 (H) 73.0 - 393.0 U/L   Urinalysis Reflex to Culture    Specimen: Urine, clean catch   Result Value Ref Range    Glucose, Urine NEGATIVE NEGATIVE mg/dl    Bilirubin Urine NEGATIVE     Ketones, Urine NEGATIVE NEGATIVE    Specific Gravity, UA 1.015 1.002 - 1.030    Blood, Urine NEGATIVE NEGATIVE    pH, UA 7.0 5.0 - 9.0    Protein,  (A) NEGATIVE mg/dl    Urobilinogen, Urine 0.2 0.0 - 1.0 eu/dl    Nitrite, Urine NEGATIVE NEGATIVE    Leukocyte Esterase, Urine NEGATIVE NEGATIVE    Color, UA DARK YELLOW (A) STRAW-YELLOW    Character, Urine CLEAR CLEAR-SL CLOUD    RBC, UA NONE 0-2/hpf /hpf    WBC, UA NONE 0-4/hpf /hpf    Epithelial Cells, UA 5-10 3-5/hpf /hpf    Amorphous, UA NONE SEEN none seen    Mucus, UA THREADS none seen    Bacteria, UA FEW few/none seen    Casts UA NONE SEEN none seen /lpf    Crystals, UA NONE SEEN none seen    REFLEX TO URINE C & S NOT INDICATED    Scan of Blood Smear   Result Value Ref Range    SCAN OF BLOOD SMEAR see below    Glomerular Filtration Rate, Estimated   Result Value Ref Range    GFR, Estimated 69 (A) ml/min/1.73m2   ANION GAP   Result Value Ref Range    Anion Gap 9.0 8.0 - 16.0 meq/l   Lipase   Result Value Ref Range    Lipase >3,000.0 (H) 5.6 - 51.3 U/L   Lactic Acid [facility] Only)   Result Value Ref Range    Lactate 2.00 (H) 0.90 - 1.70 mmol/L   Basic Metabolic Panel w/ Reflex to MG   Result Value Ref Range    Sodium 139 135 - 145 meq/L    Potassium reflex Magnesium 3.6 3.5 - 5.2 meq/L    Chloride 106 98 - 111 meq/L    CO2 21 (L) 23 - 33 meq/L    Glucose 95 70 - 108 mg/dL    BUN 10 7 - 22 mg/dL    CREATININE 0.6 0.4 - 1.2 mg/dL    Calcium 7.8 (L) 8.5 - 10.5 mg/dL   CBC auto differential   Result Value Ref Range    WBC 8.7 4.8 - 10.8 thou/mm3    RBC 3.93 (L) 4.20 - 5.40 mill/mm3    Hemoglobin 12.4 12.0 - 16.0 gm/dl    Hematocrit 37.9 37.0 - 47.0 %    MCV 96.4 81.0 - 99.0 fL    MCH 31.6 26.0 - 33.0 pg    MCHC 32.7 32.2 - 35.5 gm/dl    RDW-CV 12.5 11.5 - 14.5 %    RDW-SD 44.6 35.0 - 45.0 fL    Platelets 823 912 - 994 thou/mm3    MPV 9.7 9.4 - 12.4 fL    Seg Neutrophils 80.2 %    Lymphocytes 14.9 %    Monocytes 4.2 %    Eosinophils 0.3 %    Basophils 0.1 %    Immature Granulocytes 0.3 %    Segs Absolute 7.0 1.8 - 7.7 thou/mm3    Lymphocytes Absolute 1.3 1.0 - 4.8 thou/mm3    Monocytes Absolute 0.4 0.4 - 1.3 thou/mm3    Eosinophils Absolute 0.0 0.0 - 0.4 thou/mm3    Basophils Absolute 0.0 0.0 - 0.1 thou/mm3    Immature Grans (Abs) 0.03 0.00 - 0.07 thou/mm3    nRBC 0 /100 wbc   Anion Gap   Result Value Ref Range    Anion Gap 12.0 8.0 - 16.0 meq/L   Glomerular Filtration Rate, Estimated   Result Value Ref Range    Est, Glom Filt Rate >90 ml/min/1.73m2   Triglyceride   Result Value Ref Range    Triglycerides 97 0 - 199 mg/dL       EKG / Radiology:     EKG:  Reviewed by me --    CXR:   Reviewed by me --    CT ABDOMEN PELVIS W IV CONTRAST Additional Contrast? None    Result Date: 2/13/2022  PROCEDURE: CT ABDOMEN PELVIS W IV CONTRAST CLINICAL INFORMATION: severe abd pain, vomiting COMPARISON: CT abdomen and pelvis 9/50/0584 TECHNIQUE: Helical CT acquisition of the abdomen and pelvis was performed with administration of intravenous contrast. Multiplanar reformats are provided. All CT scans at this facility use dose modulation, iterative reconstruction, and/or weight based dosing when appropriate to reduce the radiation dose to as low as reasonably achievable. CONTRAST: 100 cc of Isovue-370  intravenously FINDINGS: Small hiatal hernia is seen. The heart is not enlarged. The common bile duct is dilated at 9 mm. The gallbladder is surgically absent. Mild intrahepatic dilatation. No focal hepatic mass is seen. There is diffuse peripancreatic inflammation. There appears to be de-enhancement in the body of the pancreas. Small to moderate amount of stool fills the colon. Otherwise, the adrenal glands, spleen, and kidneys are unremarkable. No bowel obstruction or acute inflammatory bowel process. The appendix is unremarkable. The abdominal aorta is not aneurysmal. No significantly enlarged lymph nodes are seen. The bladder is grossly unremarkable. Small fat-containing umbilical hernia. The uterus is unremarkable. Bones: Degenerative changes of the visualized thoracolumbar spine. Mild degenerative changes of the SI joints. Mild degenerative changes of both hips.  Sclerotic foci seen in the femoral heads relate to bone islands. 1. Diffuse peripancreatic inflammation relating to acute pancreatitis. 2. [place]- enhancement of the body of the pancreas suggest necrosis. 3. The postcholecystectomy common bile duct is dilated at 9 mm **This report has been created using voice recognition software. It may contain minor errors which are inherent in voice recognition technology. ** Final report electronically signed by Dr Genevieve Arndt on 2/13/2022 2:35 PM    XR CHEST PORTABLE    Result Date: 2/14/2022  PROCEDURE: XR CHEST PORTABLE CLINICAL INFORMATION: bilat base crackles COMPARISON: No prior available. TECHNIQUE: A single mobile view of the chest was obtained. 1. Mild cardiomegaly. No effusion. 2. Very mild bibasilar atelectasis/pneumonia. **This report has been created using voice recognition software. It may contain minor errors which are inherent in voice recognition technology. ** Final report electronically signed by Dr. Miller Cullen on 2/14/2022 9:27 AM    FEN/GI/DVT:  IVF: NS @ 150  Electrolytes: Monitor and replace per protocols  Diet: NPO  GI PPX: Yes  DVT Prophylaxis: Lovenox    CODE STATUS:  Full    Thank you KATIE Spaulding - AMINTA for the opportunity to be involved in this patient's care.     Electronically signed by Arturo Dawn DO on 2/14/2022 at 5:08 PM

## 2022-02-14 NOTE — PLAN OF CARE
Problem: Pain:  Goal: Pain level will decrease  Description: Pain level will decrease  Outcome: Ongoing  Note: Complains of left upper quadrant abdominal pain and medicated with IV dilauidid. Able to reach pain goal of 3. NPO except ice chips  Goal: Control of acute pain  Description: Control of acute pain  Outcome: Met This Shift  Goal: Control of chronic pain  Description: Control of chronic pain  Outcome: Met This Shift     Problem: Falls - Risk of:  Goal: Will remain free from falls  Description: Will remain free from falls  Outcome: Ongoing  Note: Up ad rasheeda in room. Gait steady. No falls this shift. Goal: Absence of physical injury  Description: Absence of physical injury  Outcome: Met This Shift     Problem: SKIN INTEGRITY  Goal: Skin integrity is maintained or improved  Outcome: Met This Shift     Problem: DISCHARGE BARRIERS  Goal: Patient's continuum of care needs are met  Outcome: Ongoing  Note: Plans to return home with  at discharge. Care plan reviewed with patient and family. Patient and family verbalize understanding of the plan of care and contribute to goal setting.

## 2022-02-15 VITALS
OXYGEN SATURATION: 97 % | DIASTOLIC BLOOD PRESSURE: 67 MMHG | HEIGHT: 67 IN | WEIGHT: 180 LBS | SYSTOLIC BLOOD PRESSURE: 153 MMHG | BODY MASS INDEX: 28.25 KG/M2 | RESPIRATION RATE: 18 BRPM | TEMPERATURE: 97 F | HEART RATE: 95 BPM

## 2022-02-15 LAB
ALBUMIN SERPL-MCNC: 3.2 G/DL (ref 3.5–5.1)
ALP BLD-CCNC: 63 U/L (ref 38–126)
ALT SERPL-CCNC: 12 U/L (ref 11–66)
ANION GAP SERPL CALCULATED.3IONS-SCNC: 12 MEQ/L (ref 8–16)
AST SERPL-CCNC: 15 U/L (ref 5–40)
BASOPHILS # BLD: 0.1 %
BASOPHILS ABSOLUTE: 0 THOU/MM3 (ref 0–0.1)
BILIRUB SERPL-MCNC: 0.6 MG/DL (ref 0.3–1.2)
BUN BLDV-MCNC: 6 MG/DL (ref 7–22)
CALCIUM SERPL-MCNC: 7.8 MG/DL (ref 8.5–10.5)
CHLORIDE BLD-SCNC: 104 MEQ/L (ref 98–111)
CO2: 21 MEQ/L (ref 23–33)
CREAT SERPL-MCNC: 0.5 MG/DL (ref 0.4–1.2)
EOSINOPHIL # BLD: 0.4 %
EOSINOPHILS ABSOLUTE: 0 THOU/MM3 (ref 0–0.4)
ERYTHROCYTE [DISTWIDTH] IN BLOOD BY AUTOMATED COUNT: 12.2 % (ref 11.5–14.5)
ERYTHROCYTE [DISTWIDTH] IN BLOOD BY AUTOMATED COUNT: 42.8 FL (ref 35–45)
GFR SERPL CREATININE-BSD FRML MDRD: > 90 ML/MIN/1.73M2
GLUCOSE BLD-MCNC: 88 MG/DL (ref 70–108)
HCT VFR BLD CALC: 35 % (ref 37–47)
HEMOGLOBIN: 11.5 GM/DL (ref 12–16)
IMMATURE GRANS (ABS): 0.04 THOU/MM3 (ref 0–0.07)
IMMATURE GRANULOCYTES: 0.4 %
LYMPHOCYTES # BLD: 15.7 %
LYMPHOCYTES ABSOLUTE: 1.4 THOU/MM3 (ref 1–4.8)
MCH RBC QN AUTO: 31.4 PG (ref 26–33)
MCHC RBC AUTO-ENTMCNC: 32.9 GM/DL (ref 32.2–35.5)
MCV RBC AUTO: 95.6 FL (ref 81–99)
MONOCYTES # BLD: 4.8 %
MONOCYTES ABSOLUTE: 0.4 THOU/MM3 (ref 0.4–1.3)
NUCLEATED RED BLOOD CELLS: 0 /100 WBC
PLATELET # BLD: 213 THOU/MM3 (ref 130–400)
PMV BLD AUTO: 9.6 FL (ref 9.4–12.4)
POTASSIUM SERPL-SCNC: 3.1 MEQ/L (ref 3.5–5.2)
RBC # BLD: 3.66 MILL/MM3 (ref 4.2–5.4)
SEG NEUTROPHILS: 78.6 %
SEGMENTED NEUTROPHILS ABSOLUTE COUNT: 7.1 THOU/MM3 (ref 1.8–7.7)
SODIUM BLD-SCNC: 137 MEQ/L (ref 135–145)
TOTAL PROTEIN: 6 G/DL (ref 6.1–8)
WBC # BLD: 9 THOU/MM3 (ref 4.8–10.8)

## 2022-02-15 PROCEDURE — 6360000002 HC RX W HCPCS: Performed by: PHYSICIAN ASSISTANT

## 2022-02-15 PROCEDURE — 36415 COLL VENOUS BLD VENIPUNCTURE: CPT

## 2022-02-15 PROCEDURE — 2580000003 HC RX 258: Performed by: PHYSICIAN ASSISTANT

## 2022-02-15 PROCEDURE — 6360000002 HC RX W HCPCS: Performed by: STUDENT IN AN ORGANIZED HEALTH CARE EDUCATION/TRAINING PROGRAM

## 2022-02-15 PROCEDURE — 85025 COMPLETE CBC W/AUTO DIFF WBC: CPT

## 2022-02-15 PROCEDURE — C9113 INJ PANTOPRAZOLE SODIUM, VIA: HCPCS | Performed by: STUDENT IN AN ORGANIZED HEALTH CARE EDUCATION/TRAINING PROGRAM

## 2022-02-15 PROCEDURE — 80053 COMPREHEN METABOLIC PANEL: CPT

## 2022-02-15 PROCEDURE — 99239 HOSP IP/OBS DSCHRG MGMT >30: CPT | Performed by: HOSPITALIST

## 2022-02-15 RX ADMIN — ENOXAPARIN SODIUM 40 MG: 100 INJECTION SUBCUTANEOUS at 10:12

## 2022-02-15 RX ADMIN — PANTOPRAZOLE SODIUM 40 MG: 40 INJECTION, POWDER, FOR SOLUTION INTRAVENOUS at 10:12

## 2022-02-15 RX ADMIN — SODIUM CHLORIDE, PRESERVATIVE FREE 10 ML: 5 INJECTION INTRAVENOUS at 10:13

## 2022-02-15 ASSESSMENT — PAIN SCALES - GENERAL
PAINLEVEL_OUTOF10: 0

## 2022-02-15 NOTE — CARE COORDINATION
DISCHARGE PLANNING UPDATE    Barriers to Discharge:   From [facility]. Pancreatitis. IVF, IV PPI continued. Diet advanced today  Discharge Plan:   plans home w spouse Mary Beth Almaguer independently as PTA likely in am  2/16/22, 7:04 AM EST    Patient goals/plan/ treatment preferences discussed by  and . Patient goals/plan/ treatment preferences reviewed with patient/ family. Patient/ family verbalize understanding of discharge plan and are in agreement with goal/plan/treatment preferences. Understanding was demonstrated using the teach back method. AVS provided by RN at time of discharge, which includes all necessary medical information pertaining to the patients current course of illness, treatment, post-discharge goals of care, and treatment preferences.

## 2022-02-15 NOTE — PROGRESS NOTES
RN spoke to patients  as he stopped by prior to going to work for the day. RN informed spouse that patient was tolerating clear liquids and jello over night with no need for administration of pain or nausea medication. Spouse was okay with information provided with no further questions or concerns at time of communication.

## 2022-02-15 NOTE — DISCHARGE SUMMARY
Hospital Medicine Discharge Summary      [facility]   : 1965  MRN: [id number]   Account: [de-identified]      Patient's PCP: KATIE Phillip CNP    Admit Date: 2022     Discharge Date:  2/15/2022    Admitting Physician: No admitting provider for patient encounter. Discharge Physician: Brendan Jose DO     Discharge Diagnoses:    Acute Pancreatitis with Necrosis  Hx of HTN    The patient was seen and examined on day of discharge and this discharge summary is in conjunction with any daily progress note from day of discharge. Hospital Course:   Patient transferred [facility] for further evaluation of pancreatitis.  The patient states she woke up at about 2am on the day of admission with excruciating abdominal pain and nausea.  She has not been able to keep anything down since.  The patient was found to have very elevated lipase.  She had her gallbladder removed in 2016.  There are no fevers, no shortness of breath, no chest pain.     Patient is admitted for further evaluation of pancreatiis. No prior history of pancreatitis. Risk factors could include medication induced. Patient on hydrochlorothiazide at home. Denies any alcohol history. History of cholecystectomy. trilycerides normal. [place]'s Criteria with 1 point indicating severe pancreatitis unlikely showing 1% predicted mortality. CT abdomen shows acute pancreatitis with De-enhanced  of body of pancreas suggesting necrosis. CBD dilated at 9 mm. Lipase >3000. Patient meets 3/3 criteria for Acute Pancreatitis. Patient was put on n.p.o. and pain control. Patient also was started on fluids. Patient was started on clears after pain had lessened. Continue to tolerate food and was advanced to low-fat diet. Blood pressure was controlled during the entire hospitalization. Discussed with patient that patient may not likely even need hypertension medications.  Advised patient to check blood pressure daily and to follow-up with PCP in 1 week for different blood pressure agent if needed. Patient also advised to continue low-fat diet for the next few days. Exam:     Vitals:  Vitals:    02/14/22 1954 02/14/22 2357 02/15/22 0415 02/15/22 0950   BP: 126/62 139/65 124/85 (!) 142/76   Pulse: 103 99 91 97   Resp: 16 18 18 18   Temp: 97.5 °F (36.4 °C) 98.6 °F (37 °C) 98.1 °F (36.7 °C) 98.1 °F (36.7 °C)   TempSrc: Oral Oral Oral Oral   SpO2: 93% 92% 93%    Weight:       Height:         Weight: Weight: 180 lb (81.6 kg)     24 hour intake/output:    Intake/Output Summary (Last 24 hours) at 2/15/2022 1659  Last data filed at 2/15/2022 1009  Gross per 24 hour   Intake 1419.64 ml   Output 875 ml   Net 544.64 ml         General appearance:  No apparent distress, appears stated age and cooperative. HEENT:  Normal cephalic, atraumatic without obvious deformity. Pupils equal, round, and reactive to light. Extra ocular muscles intact. Conjunctivae/corneas clear. Neck: Supple, with full range of motion. No jugular venous distention. Trachea midline. Respiratory:  Normal respiratory effort. Clear to auscultation, bilaterally without Rales/Wheezes/Rhonchi. Cardiovascular:  Regular rate and rhythm with normal S1/S2 without murmurs, rubs or gallops. Abdomen: Soft, non-tender, non-distended with normal bowel sounds. Musculoskeletal:  No clubbing, cyanosis or edema bilaterally. Full range of motion without deformity. Skin: Skin color, texture, turgor normal.  No rashes or lesions. Neurologic:  Neurovascularly intact without any focal sensory/motor deficits. Cranial nerves: II-XII intact, grossly non-focal.  Psychiatric:  Alert and oriented, thought content appropriate, normal insight  Capillary Refill: Brisk,< 3 seconds   Peripheral Pulses: +2 palpable, equal bilaterally       Labs:  For convenience and continuity at follow-up the following most recent labs are provided:      CBC:    Lab Results   Component Value Date    WBC 9.0 02/15/2022    HGB 11.5 02/15/2022    HCT 35.0 02/15/2022     02/15/2022       Renal:    Lab Results   Component Value Date     02/15/2022    K 3.1 02/15/2022    K 3.6 02/14/2022     02/15/2022    CO2 21 02/15/2022    BUN 6 02/15/2022    CREATININE 0.5 02/15/2022    CALCIUM 7.8 02/15/2022         Significant Diagnostic Studies    Radiology:   XR CHEST PORTABLE   Final Result   1. Mild cardiomegaly. No effusion. 2. Very mild bibasilar atelectasis/pneumonia. **This report has been created using voice recognition software. It may contain minor errors which are inherent in voice recognition technology. **      Final report electronically signed by Dr. Dorothea Summers on 2/14/2022 9:27 AM      CT ABDOMEN PELVIS W IV CONTRAST Additional Contrast? None   Final Result   1. Diffuse peripancreatic inflammation relating to acute pancreatitis. 2. [place]- enhancement of the body of the pancreas suggest necrosis. 3. The postcholecystectomy common bile duct is dilated at 9 mm            **This report has been created using voice recognition software. It may contain minor errors which are inherent in voice recognition technology. **      Final report electronically signed by Dr Chasity Macias on 2/13/2022 2:35 PM             Consults:     None    Disposition: Home  Condition at Discharge: Stable    Code Status:  Full Code     Patient Instructions:    Discharge lab work: None  Activity: activity as tolerated  Diet: ADULT DIET;  Regular; Low Fat/Low Chol/High Fiber/BRENDA      Follow-up visits:   KATIE De Jesus - CNP  [address] [facility]  [phone]    In 1 week           Discharge Medications:        Medication List      CONTINUE taking these medications    therapeutic multivitamin-minerals tablet     zinc gluconate 50 MG tablet        STOP taking these medications    hydroCHLOROthiazide 25 MG tablet  Commonly known as: HYDRODIURIL            Time Spent on discharge is more than 30 minutes in the examination, evaluation, counseling and review of medications and discharge plan. Signed: Thank you KATIE Zhu CNP for the opportunity to be involved in this patient's care.     Electronically signed by Cathleen Singleton DO on 2/15/2022 at 4:59 PM

## 2022-02-15 NOTE — PLAN OF CARE
Problem: Pain:  Goal: Pain level will decrease  Description: Pain level will decrease  Outcome: Completed  Goal: Control of acute pain  Description: Control of acute pain  Outcome: Completed  Goal: Control of chronic pain  Description: Control of chronic pain  Outcome: Completed     Problem: Falls - Risk of:  Goal: Will remain free from falls  Description: Will remain free from falls  Outcome: Completed  Goal: Absence of physical injury  Description: Absence of physical injury  Outcome: Completed     Problem: SKIN INTEGRITY  Goal: Skin integrity is maintained or improved  Outcome: Completed     Problem: DISCHARGE BARRIERS  Goal: Patient's continuum of care needs are met  Outcome: Completed     Problem: Pain:  Goal: Pain level will decrease  Description: Pain level will decrease  Outcome: Completed  Goal: Control of acute pain  Description: Control of acute pain  Outcome: Completed  Goal: Control of chronic pain  Description: Control of chronic pain  Outcome: Completed     Problem: Falls - Risk of:  Goal: Will remain free from falls  Description: Will remain free from falls  Outcome: Completed  Goal: Absence of physical injury  Description: Absence of physical injury  Outcome: Completed     Problem: SKIN INTEGRITY  Goal: Skin integrity is maintained or improved  Outcome: Completed     Problem: DISCHARGE BARRIERS  Goal: Patient's continuum of care needs are met  Outcome: Completed

## 2022-02-16 NOTE — PROGRESS NOTES
Physician Progress Note      Estephanie Ambriz  CSN #:                  [id number]  :                       1965  ADMIT DATE:       2022 1:36 PM  [address] DATE:        2/15/2022 6:15 PM  RESPONDING  PROVIDER #:        Chai Bonds DO          QUERY TEXT:    Patient admitted with acute pancreatitis, noted to have elevated lactate. If   possible, please document in progress notes and discharge summary if you are   evaluating and/or treating any of the following: The medical record reflects the following:    Risk Factors: pancreatitis  Clinical Indicators: presents with nausea and vomiting several times, lactate   2.0  Treatment: IVF bolus and IVF    Thank you! Artem Marrufo CRCR  RN Clinical   P: [phone]  Options provided:  -- Lactic acidosis  -- Lactate level not clinically significant  -- Other - I will add my own diagnosis  -- Disagree - Not applicable / Not valid  -- Disagree - Clinically unable to determine / Unknown  -- Refer to Clinical Documentation Reviewer    PROVIDER RESPONSE TEXT:    This patient has lactic acidosis.     Query created by: Kayleigh Souza on 2/15/2022 1:59 PM      Electronically signed by:  Chai Bonds DO 2022 8:04 AM

## 2022-02-16 NOTE — PROGRESS NOTES
Patient discharge packet completed with patient. Education added to discharge paperwork regarding diet. All patient questions answered at this time, no further questions.

## 2022-02-19 LAB — BLOOD CULTURE, ROUTINE: NORMAL

## 2022-02-21 LAB — BLOOD CULTURE, ROUTINE: NORMAL

## 2022-03-21 ENCOUNTER — HOSPITAL ENCOUNTER (OUTPATIENT)
Dept: CT IMAGING | Age: 57
Discharge: HOME | End: 2022-03-21
Payer: COMMERCIAL

## 2022-03-21 DIAGNOSIS — K85.91 ACUTE PANCREATIC NECROSIS: ICD-10-CM

## 2022-03-21 PROCEDURE — 74177 CT ABD & PELVIS W/CONTRAST: CPT

## 2022-03-21 PROCEDURE — 6360000004 HC RX CONTRAST MEDICATION: Performed by: NURSE PRACTITIONER

## 2022-03-21 RX ADMIN — IOPAMIDOL 100 ML: 755 INJECTION, SOLUTION INTRAVENOUS at 07:59

## 2022-03-21 RX ADMIN — IOHEXOL 50 ML: 240 INJECTION, SOLUTION INTRATHECAL; INTRAVASCULAR; INTRAVENOUS; ORAL at 06:45

## 2024-10-30 ENCOUNTER — HOSPITAL ENCOUNTER (EMERGENCY)
Age: 59
Discharge: HOME | End: 2024-10-30
Attending: FAMILY MEDICINE
Payer: COMMERCIAL

## 2024-10-30 VITALS
DIASTOLIC BLOOD PRESSURE: 60 MMHG | OXYGEN SATURATION: 98 % | HEIGHT: 67 IN | HEART RATE: 52 BPM | RESPIRATION RATE: 16 BRPM | SYSTOLIC BLOOD PRESSURE: 113 MMHG | BODY MASS INDEX: 27.78 KG/M2 | TEMPERATURE: 97.7 F | WEIGHT: 177 LBS

## 2024-10-30 DIAGNOSIS — L03.012 CELLULITIS OF LEFT THUMB: Primary | ICD-10-CM

## 2024-10-30 PROCEDURE — 99283 EMERGENCY DEPT VISIT LOW MDM: CPT

## 2024-10-30 PROCEDURE — 6370000000 HC RX 637 (ALT 250 FOR IP): Performed by: FAMILY MEDICINE

## 2024-10-30 RX ORDER — CEPHALEXIN 500 MG/1
500 CAPSULE ORAL 4 TIMES DAILY
Qty: 28 CAPSULE | Refills: 0 | Status: SHIPPED | OUTPATIENT
Start: 2024-10-30 | End: 2024-11-06

## 2024-10-30 RX ORDER — SULFAMETHOXAZOLE AND TRIMETHOPRIM 800; 160 MG/1; MG/1
1 TABLET ORAL ONCE
Status: COMPLETED | OUTPATIENT
Start: 2024-10-30 | End: 2024-10-30

## 2024-10-30 RX ORDER — CEPHALEXIN 500 MG/1
500 CAPSULE ORAL ONCE
Status: COMPLETED | OUTPATIENT
Start: 2024-10-30 | End: 2024-10-30

## 2024-10-30 RX ORDER — LISINOPRIL 10 MG/1
10 TABLET ORAL DAILY
COMMUNITY

## 2024-10-30 RX ORDER — ATORVASTATIN CALCIUM 10 MG/1
10 TABLET, FILM COATED ORAL DAILY
COMMUNITY

## 2024-10-30 RX ORDER — SULFAMETHOXAZOLE AND TRIMETHOPRIM 800; 160 MG/1; MG/1
1 TABLET ORAL 2 TIMES DAILY
Qty: 14 TABLET | Refills: 0 | Status: SHIPPED | OUTPATIENT
Start: 2024-10-30 | End: 2024-11-06

## 2024-10-30 RX ORDER — ASPIRIN 325 MG
325 TABLET ORAL DAILY
COMMUNITY

## 2024-10-30 RX ADMIN — SULFAMETHOXAZOLE AND TRIMETHOPRIM 1 TABLET: 800; 160 TABLET ORAL at 05:15

## 2024-10-30 RX ADMIN — CEPHALEXIN 500 MG: 500 CAPSULE ORAL at 05:15

## 2024-10-30 ASSESSMENT — PAIN DESCRIPTION - DESCRIPTORS
DESCRIPTORS: THROBBING
DESCRIPTORS: DULL;DISCOMFORT
DESCRIPTORS: DISCOMFORT

## 2024-10-30 ASSESSMENT — LIFESTYLE VARIABLES
HOW OFTEN DO YOU HAVE A DRINK CONTAINING ALCOHOL: NEVER
HOW MANY STANDARD DRINKS CONTAINING ALCOHOL DO YOU HAVE ON A TYPICAL DAY: PATIENT DOES NOT DRINK

## 2024-10-30 ASSESSMENT — ENCOUNTER SYMPTOMS
NAUSEA: 0
VOMITING: 0

## 2024-10-30 ASSESSMENT — PAIN SCALES - GENERAL
PAINLEVEL_OUTOF10: 4
PAINLEVEL_OUTOF10: 4
PAINLEVEL_OUTOF10: 10

## 2024-10-30 ASSESSMENT — PAIN - FUNCTIONAL ASSESSMENT
PAIN_FUNCTIONAL_ASSESSMENT: 0-10

## 2024-10-30 ASSESSMENT — PAIN DESCRIPTION - LOCATION
LOCATION: FINGER (COMMENT WHICH ONE)

## 2024-10-30 ASSESSMENT — PAIN DESCRIPTION - ORIENTATION
ORIENTATION: LEFT

## 2024-10-30 NOTE — ED NOTES
Patient became lightheaded when thumb was cleaned and dressed. Patient given cool cloth, comfort provided. Patient related \"she felt much better.\" Patient taken by W/C to the car. Observed patient steadily get into vehicle. Patient voiced appreciation.

## 2024-10-30 NOTE — DISCHARGE INSTRUCTIONS
Warm soaks to left thumb. Bactrim as prescribed. Keflex as prescribed. Follow up with PCP in next few days for re evaluation.

## 2024-10-30 NOTE — ED PROVIDER NOTES
[facility]  eMERGENCY dEPARTMENT eNCOUnter          CHIEF COMPLAINT       Chief Complaint   Patient presents with    OTHER     left thumb swollen       Nurses Notes reviewed and I agree except as noted in the HPI.      HISTORY OF PRESENT ILLNESS    Precious Hdez is a 59 y.o. female who presents with left thumb swelling and redness. Patient notes a few days ago she noted some left edge of fingernail was swollen. Today noted left thumb is red and swollen. No generalized fevers.          REVIEW OF SYSTEMS     Review of Systems   Gastrointestinal:  Negative for nausea and vomiting.   Musculoskeletal:  Positive for joint swelling (left thumb).   Skin:  Positive for rash (left finger).   All other systems reviewed and are negative.        PAST MEDICAL HISTORY    has a past medical history of GERD (gastroesophageal reflux disease), Hyperlipidemia, and Hypertension.    SURGICAL HISTORY      has a past surgical history that includes  section (,); Tubal ligation (); Cholecystectomy (2016); ERCP (16); Colonoscopy (2016); and Foot surgery (Left, 2021).    CURRENT MEDICATIONS       Previous Medications    ASPIRIN 325 MG TABLET    Take 1 tablet by mouth daily Indications: 3 tabs    ATORVASTATIN (LIPITOR) 10 MG TABLET    Take 1 tablet by mouth daily    LISINOPRIL (PRINIVIL;ZESTRIL) 10 MG TABLET    Take 1 tablet by mouth daily    MULTIPLE VITAMINS-MINERALS (THERAPEUTIC MULTIVITAMIN-MINERALS) TABLET    Take 1 tablet by mouth daily    OMEPRAZOLE (PRILOSEC) 20 MG DELAYED RELEASE CAPSULE    Take 1 capsule by mouth daily    PANCRELIPASE, LIP-PROT-AMYL, (ZENPEP PO)    Take by mouth Indications: unknown dose    ZINC GLUCONATE 50 MG TABLET    Take 1 tablet by mouth daily       ALLERGIES     has No Known Allergies.    FAMILY HISTORY     She indicated that her mother is . She indicated that her father is alive. She indicated that the status of her brother is unknown.

## 2024-10-30 NOTE — ED TRIAGE NOTES
Patient reported, \"Monday I felt something on the side, yesterday it got worse. I took 3 aspirin last night before bed at 2200. I couldn't sleep and it has swollen I tried, ice and elevation.\" Observed swelling on the thumb with a dark spot on the outer edge of nail.\"